# Patient Record
Sex: MALE | Race: WHITE | NOT HISPANIC OR LATINO | Employment: OTHER | URBAN - METROPOLITAN AREA
[De-identification: names, ages, dates, MRNs, and addresses within clinical notes are randomized per-mention and may not be internally consistent; named-entity substitution may affect disease eponyms.]

---

## 2017-04-24 ENCOUNTER — OFFICE VISIT (OUTPATIENT)
Dept: ORTHOPEDIC SURGERY | Facility: CLINIC | Age: 67
End: 2017-04-24

## 2017-04-24 VITALS
DIASTOLIC BLOOD PRESSURE: 96 MMHG | WEIGHT: 196 LBS | BODY MASS INDEX: 29.03 KG/M2 | HEIGHT: 69 IN | HEART RATE: 73 BPM | SYSTOLIC BLOOD PRESSURE: 158 MMHG

## 2017-04-24 DIAGNOSIS — M21.6X1 ACQUIRED ABDUCTION DEFORMITY OF RIGHT FOOT: Primary | ICD-10-CM

## 2017-04-24 DIAGNOSIS — E11.42 TYPE 2 DIABETES MELLITUS WITH DIABETIC POLYNEUROPATHY, WITHOUT LONG-TERM CURRENT USE OF INSULIN (HCC): ICD-10-CM

## 2017-04-24 PROCEDURE — 99213 OFFICE O/P EST LOW 20 MIN: CPT | Performed by: ORTHOPAEDIC SURGERY

## 2017-04-24 NOTE — PROGRESS NOTES
"ESTABLISHED PATIENT    Patient: Manuel Leonard  : 1950    Primary Care Provider: Lucretia Sorenson MD    Requesting Provider: As above    Post-op of the Right Foot ((R) 1st MTPJ fusion, excision PIPJ toes 2-4, flexor tenotomy toes 2-4, MT capsulotomy/ extensor tendon lengthening toes 2-4, extensor tenotomy 5th toe 11/15/2016)      History    Chief Complaint: doing well, wants to be out of boot    History of Present Illness: doing well post op    Current Outpatient Prescriptions on File Prior to Visit   Medication Sig Dispense Refill   • aspirin 81 MG chewable tablet Chew 81 mg Daily. PT TOLD NOT TO STOP THIS     • atorvastatin (LIPITOR) 80 MG tablet Take 80 mg by mouth Daily.     • colesevelam (WELCHOL) 625 MG tablet Take 1,875 mg by mouth 2 (Two) Times a Day With Meals.     • docusate sodium (COLACE) 100 MG capsule Take 1 capsule by mouth 2 (Two) Times a Day. 60 capsule 0   • enoxaparin (LOVENOX) 100 MG/ML solution syringe Inject 0.9 mL under the skin Every 12 (Twelve) Hours. Until INR therapeutic 25.2 mL 0   • gabapentin (NEURONTIN) 300 MG capsule Take 300 mg by mouth 3 (Three) Times a Day.     • gemfibrozil (LOPID) 600 MG tablet Take 600 mg by mouth 2 (Two) Times a Day Before Meals.     • hydrochlorothiazide (MICROZIDE) 12.5 MG capsule Take 12.5 mg by mouth Daily.     • losartan (COZAAR) 25 MG tablet Take 25 mg by mouth Daily.     • metFORMIN (GLUCOPHAGE) 500 MG tablet Take 500 mg by mouth 2 (Two) Times a Day With Meals.     • metoprolol tartrate (LOPRESSOR) 25 MG tablet Take 25 mg by mouth Daily.     • ondansetron (ZOFRAN) 4 MG tablet Take 1 tablet by mouth Every 6 (Six) Hours As Needed for nausea.  0   • ropivacaine (NAROPIN) 0.2 % 12 mg/hr by Popliteal route Continuous.     • warfarin (COUMADIN) 5 MG tablet Take 5 mg by mouth Daily.       No current facility-administered medications on file prior to visit.       Allergies   Allergen Reactions   • Adhesive Tape Other (See Comments)     \"BLISTERS\" "   • Other Swelling     MANGOS CAUSE THROAT SWELLING      Past Medical History:   Diagnosis Date   • Anemia     HX   • Arthritis    • Diabetes mellitus     DX 2006, FSBS 1 X MONTH   • High cholesterol    • [place] (hard of hearing)     ARMIN EARS   • Hypertension    • Joint pain    • MI (myocardial infarction)    • Wears glasses      Past Surgical History:   Procedure Laterality Date   • AORTIC VALVE REPAIR/REPLACEMENT     • COLONOSCOPY     • EYE SURGERY      bilateral cataracts   • KNEE ARTHROPLASTY Bilateral    • TOE FUSION Right 11/15/2016    Procedure: RIGHT 1ST METATARSOPHALANGEAL JOINT FUSION, EXCISE BONE PROXIMAL INTERPHALANGEAL TOES 2-4, METARSAL CAPSULOTOMY TOES 2-4;  Surgeon: Shaina Escalante MD;  Location: [facility];  Service:      Family History   Problem Relation Age of Onset   • Heart disease Mother    • Hypertension Father    • Heart disease Father    • Stroke Other    • Rheum arthritis Other       Social History     Social History   • Marital status:      Spouse name: N/A   • Number of children: N/A   • Years of education: N/A     Occupational History   • Not on file.     Social History Main Topics   • Smoking status: Never Smoker   • Smokeless tobacco: Never Used   • Alcohol use Yes      Comment: 2 PER WEEK   • Drug use: No   • Sexual activity: Not Currently     Other Topics Concern   • Not on file     Social History Narrative        Review of Systems   Constitutional: Negative.    HENT: Positive for hearing loss and tinnitus.    Eyes: Positive for visual disturbance.   Respiratory: Negative.    Cardiovascular: Negative.    Gastrointestinal: Negative.    Endocrine: Negative.    Genitourinary: Negative.    Musculoskeletal: Positive for joint swelling (pain, stiffness).        Muscle pain     Skin: Negative.    Allergic/Immunologic: Negative.    Neurological: Positive for numbness.   Hematological: Negative.    Psychiatric/Behavioral: Negative.        The following portions of the patient's history  "were reviewed and updated as appropriate: allergies, current medications, past family history, past medical history, past social history, past surgical history and problem list.    Physical Exam:   /96  Pulse 73  Ht 69\" (175.3 cm)  Wt 196 lb (88.9 kg)  BMI 28.94 kg/m2  GENERAL: Body habitus: normal weight for height    Lower extremity edema: Left: none; Right: trace    Gait: antalgic     Mental Status:  awake and alert; oriented to person, place, and time  MSK:         Foot:  Right:  excellent alignment, mild normal swelling, incions healed, normal dependent rubor        Medical Decision Making    Data Review:   ordered and reviewed x-rays today    Assessment/Plan/Diagnosis/Treatment Options:   Doing very well, he may come out of the boot to a wide shoe now, slowly increase activity.   I will se him in 3 months for final xray                        "

## 2017-07-31 ENCOUNTER — OFFICE VISIT (OUTPATIENT)
Dept: ORTHOPEDIC SURGERY | Facility: CLINIC | Age: 67
End: 2017-07-31

## 2017-07-31 DIAGNOSIS — M21.6X1 ACQUIRED ABDUCTION DEFORMITY OF RIGHT FOOT: ICD-10-CM

## 2017-07-31 DIAGNOSIS — E11.42 TYPE 2 DIABETES MELLITUS WITH DIABETIC POLYNEUROPATHY, WITHOUT LONG-TERM CURRENT USE OF INSULIN (HCC): Primary | ICD-10-CM

## 2017-07-31 PROCEDURE — 99213 OFFICE O/P EST LOW 20 MIN: CPT | Performed by: ORTHOPAEDIC SURGERY

## 2017-07-31 NOTE — PROGRESS NOTES
ESTABLISHED PATIENT    Patient: Manuel Leonard  : 1950    Primary Care Provider: Lucretia Sorenson MD    Requesting Provider: As above    Follow-up of the Right Foot (8 months s/p RIGHT 1ST METATARSOPHALANGEAL JOINT FUSION, EXCISE BONE PROXIMAL INTERPHALANGEAL TOES 2-4, METARSAL CAPSULOTOMY TOES 2-4 / ///)      History    Chief Complaint: Status post right foot surgery    History of Present Illness: Mr. leonard is doing very well status post his right forefoot reconstruction.  He is 8 months out from first MTPJ fusion, he reports excellent pain relief compared to preoperatively.  He does note that the fourth toe tends to drift upwards little bit.    Current Outpatient Prescriptions on File Prior to Visit   Medication Sig Dispense Refill   • aspirin 81 MG chewable tablet Chew 81 mg Daily. PT TOLD NOT TO STOP THIS     • atorvastatin (LIPITOR) 80 MG tablet Take 80 mg by mouth Daily.     • colesevelam (WELCHOL) 625 MG tablet Take 1,875 mg by mouth 2 (Two) Times a Day With Meals.     • docusate sodium (COLACE) 100 MG capsule Take 1 capsule by mouth 2 (Two) Times a Day. 60 capsule 0   • enoxaparin (LOVENOX) 100 MG/ML solution syringe Inject 0.9 mL under the skin Every 12 (Twelve) Hours. Until INR therapeutic 25.2 mL 0   • gabapentin (NEURONTIN) 300 MG capsule Take 300 mg by mouth 3 (Three) Times a Day.     • gemfibrozil (LOPID) 600 MG tablet Take 600 mg by mouth 2 (Two) Times a Day Before Meals.     • hydrochlorothiazide (MICROZIDE) 12.5 MG capsule Take 12.5 mg by mouth Daily.     • losartan (COZAAR) 25 MG tablet Take 25 mg by mouth Daily.     • metFORMIN (GLUCOPHAGE) 500 MG tablet Take 500 mg by mouth 2 (Two) Times a Day With Meals.     • metoprolol tartrate (LOPRESSOR) 25 MG tablet Take 25 mg by mouth Daily.     • ondansetron (ZOFRAN) 4 MG tablet Take 1 tablet by mouth Every 6 (Six) Hours As Needed for nausea.  0   • ropivacaine (NAROPIN) 0.2 % 12 mg/hr by Popliteal route Continuous.     • warfarin (COUMADIN)  "5 MG tablet Take 5 mg by mouth Daily.       No current facility-administered medications on file prior to visit.       Allergies   Allergen Reactions   • Adhesive Tape Other (See Comments)     \"BLISTERS\"   • Other Swelling     MANGOS CAUSE THROAT SWELLING      Past Medical History:   Diagnosis Date   • Anemia     HX   • Arthritis    • Diabetes mellitus     DX 2006, FSBS 1 X MONTH   • High cholesterol    • [place] (hard of hearing)     ARMIN EARS   • Hypertension    • Joint pain    • MI (myocardial infarction)    • Wears glasses      Past Surgical History:   Procedure Laterality Date   • AORTIC VALVE REPAIR/REPLACEMENT     • COLONOSCOPY     • EYE SURGERY      bilateral cataracts   • KNEE ARTHROPLASTY Bilateral    • TOE FUSION Right 11/15/2016    Procedure: RIGHT 1ST METATARSOPHALANGEAL JOINT FUSION, EXCISE BONE PROXIMAL INTERPHALANGEAL TOES 2-4, METARSAL CAPSULOTOMY TOES 2-4;  Surgeon: Shaina Escalante MD;  Location: [facility];  Service:      Family History   Problem Relation Age of Onset   • Heart disease Mother    • Hypertension Father    • Heart disease Father    • Stroke Other    • Rheum arthritis Other       Social History     Social History   • Marital status:      Spouse name: N/A   • Number of children: N/A   • Years of education: N/A     Occupational History   • Not on file.     Social History Main Topics   • Smoking status: Never Smoker   • Smokeless tobacco: Never Used   • Alcohol use Yes      Comment: 2 PER WEEK   • Drug use: No   • Sexual activity: Not Currently     Other Topics Concern   • Not on file     Social History Narrative        Review of Systems    The following portions of the patient's history were reviewed and updated as appropriate: allergies, current medications, past family history, past medical history, past social history, past surgical history and problem list.    Physical Exam:   There were no vitals taken for this visit.  GENERAL: Body habitus: overweight    Lower extremity edema: " Left: none; Right: none    Gait: normal     Mental Status:  awake and alert; oriented to person, place, and time  MSK:  [place]:  Right:  non tender; Left:  non tender        Ankle:  Right: non tender; Left:  non tender        Foot:  Right:  No tenderness in the right foot, excellent alignment except at the fourth toe tends to angle upward a little bit, no diabetic foot problems, old incisions are healed; Left:  No diabetic foot problems        Medical Decision Making    Data Review:   ordered and reviewed x-rays today    Assessment/Plan/Diagnosis/Treatment Options:   He has done very well.  The only concern is that fourth toe.  I explained that it's moderately common for the small toes to angle a little bit.  I showed him how to tape the toe down to keep it from rubbing his shoe.  He is very happy with the pain relief.  I will be happy to see him any time.  His wife was with him and we discussed everything in detail